# Patient Record
Sex: MALE | Race: WHITE | NOT HISPANIC OR LATINO
[De-identification: names, ages, dates, MRNs, and addresses within clinical notes are randomized per-mention and may not be internally consistent; named-entity substitution may affect disease eponyms.]

---

## 2017-06-06 ENCOUNTER — RESULT REVIEW (OUTPATIENT)
Age: 57
End: 2017-06-06

## 2018-05-08 ENCOUNTER — RX RENEWAL (OUTPATIENT)
Age: 58
End: 2018-05-08

## 2018-10-01 ENCOUNTER — RX RENEWAL (OUTPATIENT)
Age: 58
End: 2018-10-01

## 2018-10-01 RX ORDER — METOPROLOL SUCCINATE 25 MG/1
25 TABLET, EXTENDED RELEASE ORAL
Qty: 180 | Refills: 1 | Status: ACTIVE | COMMUNITY
Start: 2018-05-08 | End: 1900-01-01

## 2019-02-06 ENCOUNTER — APPOINTMENT (OUTPATIENT)
Dept: CARDIOLOGY | Facility: CLINIC | Age: 59
End: 2019-02-06

## 2022-03-31 ENCOUNTER — LABORATORY RESULT (OUTPATIENT)
Age: 62
End: 2022-03-31

## 2022-04-01 ENCOUNTER — APPOINTMENT (OUTPATIENT)
Dept: CARDIOLOGY | Facility: CLINIC | Age: 62
End: 2022-04-01
Payer: COMMERCIAL

## 2022-04-01 VITALS
HEIGHT: 69 IN | TEMPERATURE: 97.4 F | OXYGEN SATURATION: 99 % | RESPIRATION RATE: 16 BRPM | WEIGHT: 175 LBS | HEART RATE: 66 BPM | SYSTOLIC BLOOD PRESSURE: 130 MMHG | BODY MASS INDEX: 25.92 KG/M2 | DIASTOLIC BLOOD PRESSURE: 84 MMHG

## 2022-04-01 PROCEDURE — 93015 CV STRESS TEST SUPVJ I&R: CPT

## 2022-04-01 PROCEDURE — 93306 TTE W/DOPPLER COMPLETE: CPT

## 2022-04-01 PROCEDURE — 99213 OFFICE O/P EST LOW 20 MIN: CPT | Mod: 25

## 2022-04-01 NOTE — DISCUSSION/SUMMARY
[FreeTextEntry1] : This is a 61-year-old male with past medical history significant for occasional palpitations, dyspepsia, who comes in for cardiac consultation.  He denies chest pain, shortness of breath, dizziness or syncope.  He may get occasional dyspnea on exertion walking up hills.\par He has no history of rheumatic fever.  He does not drink excessive caffeine or alcohol.  He has no known cardiac risk factors.\par He had a normal cardiac catheterization in 2003.\par The patient had a normal exercise stress test April 1, 2022.\par The patient will have an echo Doppler examination today to rule out mitral valve prolapse, evaluate his left ventricular function, chamber size, and rule out hypertrophy.\par Blood work will be done for electrolytes and lipid panel.\par The patient is encouraged to increase his fluid intake.  His palpitations have been relieved on Toprol-XL 25 mg daily.\par He exercises on a regular basis participating in [employer] theory class throughout the week.\par He will continue his current aerobic activity.  He understands he must increase his hydration.\par I have also recommended he put in more incline work in his daily exercise and orange therapy routine.\par The patient understands that aerobic exercises must be increased to 40 minutes 4 times per week. A detailed discussion of lifestyle modification was done today. The patient has a good understanding of the diagnosis, and treatment plan. Lifestyle modification was also outlined.

## 2022-04-01 NOTE — PHYSICAL EXAM
[Well Developed] : well developed [Well Nourished] : well nourished [No Acute Distress] : no acute distress [Normal Conjunctiva] : normal conjunctiva [Normal Venous Pressure] : normal venous pressure [No Carotid Bruit] : no carotid bruit [Normal S1, S2] : normal S1, S2 [No Rub] : no rub [5th Left ICS - MCL] : palpated at the 5th LICS in the midclavicular line [Normal] : normal [No Precordial Heave] : no precordial heave was noted [Normal Rate] : normal [Rhythm Regular] : regular [Normal S1] : normal S1 [Normal S2] : normal S2 [No Gallop] : no gallop heard [No Murmur] : no murmurs heard [No Pitting Edema] : no pitting edema present [2+] : left 2+ [No Abnormalities] : the abdominal aorta was not enlarged and no bruit was heard [Clear Lung Fields] : clear lung fields [Good Air Entry] : good air entry [No Respiratory Distress] : no respiratory distress  [Soft] : abdomen soft [Non Tender] : non-tender [No Masses/organomegaly] : no masses/organomegaly [Normal Bowel Sounds] : normal bowel sounds [Normal Gait] : normal gait [No Edema] : no edema [No Cyanosis] : no cyanosis [No Clubbing] : no clubbing [No Varicosities] : no varicosities [No Rash] : no rash [No Skin Lesions] : no skin lesions [Moves all extremities] : moves all extremities [No Focal Deficits] : no focal deficits [Normal Speech] : normal speech [Alert and Oriented] : alert and oriented [Normal memory] : normal memory [S3] : no S3 [S4] : no S4 [Click] : no click [Distant] : the heart sounds were ~L not distant [Pericardial Rub] : no pericardial rub [Right Carotid Bruit] : no bruit heard over the right carotid [Left Carotid Bruit] : no bruit heard over the left carotid [Right Femoral Bruit] : no bruit heard over the right femoral artery [Left Femoral Bruit] : no bruit heard over the left femoral artery

## 2022-04-04 RX ORDER — OMEPRAZOLE 20 MG/1
20 CAPSULE, DELAYED RELEASE ORAL DAILY
Qty: 30 | Refills: 1 | Status: ACTIVE | COMMUNITY
Start: 2022-04-04

## 2023-03-17 ENCOUNTER — LABORATORY RESULT (OUTPATIENT)
Age: 63
End: 2023-03-17

## 2023-03-17 ENCOUNTER — APPOINTMENT (OUTPATIENT)
Dept: CARDIOLOGY | Facility: CLINIC | Age: 63
End: 2023-03-17
Payer: COMMERCIAL

## 2023-03-17 VITALS
WEIGHT: 181 LBS | BODY MASS INDEX: 27.43 KG/M2 | SYSTOLIC BLOOD PRESSURE: 129 MMHG | DIASTOLIC BLOOD PRESSURE: 81 MMHG | HEART RATE: 55 BPM | RESPIRATION RATE: 16 BRPM | TEMPERATURE: 98.2 F | OXYGEN SATURATION: 95 % | HEIGHT: 68 IN

## 2023-03-17 PROCEDURE — 99213 OFFICE O/P EST LOW 20 MIN: CPT | Mod: 25

## 2023-03-17 PROCEDURE — 93015 CV STRESS TEST SUPVJ I&R: CPT

## 2023-03-20 NOTE — DISCUSSION/SUMMARY
[FreeTextEntry1] : This is a 62-year-old male with past medical history significant for COVID-19 May 2022, occasional palpitations, dyspepsia, who comes in for cardiac consultation.  He denies chest pain, shortness of breath, dizziness or syncope.  He may get occasional dyspnea on exertion with stairs.  He may still get occasional palpitations likely due to his atrial premature contractions.  He is starting to exercise on a regular basis and has noted occasional shortness of breath and dizziness after exercising.\par He has no history of rheumatic fever.  He does not drink excessive caffeine or alcohol.\par He has no known cardiac risk factors.\par The patient had a normal exercise stress test March 17, 2023.\par The patient will schedule an echo Doppler examination to evaluate his left ventricular function, chamber size, rule out mitral valve prolapse, and rule out hypertrophy.\par Nonfasting lipid panel done April 1, 2022 demonstrated cholesterol 186, HDL 41, LDL calculated 81, non-HDL cholesterol 145 mg/dL and triglycerides are 321 mg/dL (nonfasting), and direct LDL cholesterol 112 mg/dL.\par The patient is concerned about early atherosclerotic heart disease.  He will have blood work done today including lipoprotein a, lipoprotein B, C-reactive protein, SMA-20 and hemoglobin A1c.\par He will continue on his current diet and exercise program.\par He had a normal cardiac catheterization in 2003.\par The patient had a normal exercise stress test April 1, 2022.\par The patient will have an echo Doppler examination today to rule out mitral valve prolapse, evaluate his left ventricular function, chamber size, and rule out hypertrophy.\par Blood work will be done for electrolytes and lipid panel.\par The patient is encouraged to increase his fluid intake.  His palpitations have been relieved on Toprol-XL 25 mg daily.\par He continues to exercise on a regular basis participating in [employer] theory class throughout the week.\par He will continue his current aerobic activity.  He understands he must increase his hydration.\par I have also recommended he put in more incline work in his daily exercise and orange therapy routine.\par The patient understands that aerobic exercises must be increased to 40 minutes 4 times per week. A detailed discussion of lifestyle modification was done today. The patient has a good understanding of the diagnosis, and treatment plan. Lifestyle modification was also outlined.

## 2023-03-20 NOTE — PHYSICAL EXAM
[Well Developed] : well developed [Well Nourished] : well nourished [No Acute Distress] : no acute distress [Normal Conjunctiva] : normal conjunctiva [Normal Venous Pressure] : normal venous pressure [No Carotid Bruit] : no carotid bruit [Normal S1, S2] : normal S1, S2 [No Rub] : no rub [5th Left ICS - MCL] : palpated at the 5th LICS in the midclavicular line [Normal] : normal [No Precordial Heave] : no precordial heave was noted [Normal Rate] : normal [Rhythm Regular] : regular [Normal S1] : normal S1 [Normal S2] : normal S2 [No Gallop] : no gallop heard [No Murmur] : no murmurs heard [No Pitting Edema] : no pitting edema present [2+] : left 2+ [No Abnormalities] : the abdominal aorta was not enlarged and no bruit was heard [Clear Lung Fields] : clear lung fields [Good Air Entry] : good air entry [No Respiratory Distress] : no respiratory distress  [Soft] : abdomen soft [Non Tender] : non-tender [No Masses/organomegaly] : no masses/organomegaly [Normal Gait] : normal gait [Normal Bowel Sounds] : normal bowel sounds [No Edema] : no edema [No Cyanosis] : no cyanosis [No Clubbing] : no clubbing [No Varicosities] : no varicosities [No Rash] : no rash [No Skin Lesions] : no skin lesions [Moves all extremities] : moves all extremities [No Focal Deficits] : no focal deficits [Normal Speech] : normal speech [Alert and Oriented] : alert and oriented [Normal memory] : normal memory [S3] : no S3 [S4] : no S4 [Click] : no click [Pericardial Rub] : no pericardial rub [Right Carotid Bruit] : no bruit heard over the right carotid [Left Carotid Bruit] : no bruit heard over the left carotid [Right Femoral Bruit] : no bruit heard over the right femoral artery [Left Femoral Bruit] : no bruit heard over the left femoral artery

## 2023-03-21 RX ORDER — FINASTERIDE 1 MG/1
1 TABLET ORAL DAILY
Refills: 0 | Status: COMPLETED | COMMUNITY
Start: 2022-04-04 | End: 2023-03-21

## 2023-03-28 ENCOUNTER — APPOINTMENT (OUTPATIENT)
Dept: CARDIOLOGY | Facility: CLINIC | Age: 63
End: 2023-03-28
Payer: COMMERCIAL

## 2023-03-28 PROCEDURE — 93306 TTE W/DOPPLER COMPLETE: CPT

## 2024-03-07 ENCOUNTER — APPOINTMENT (OUTPATIENT)
Dept: CARDIOLOGY | Facility: CLINIC | Age: 64
End: 2024-03-07
Payer: COMMERCIAL

## 2024-03-07 ENCOUNTER — LABORATORY RESULT (OUTPATIENT)
Age: 64
End: 2024-03-07

## 2024-03-07 ENCOUNTER — NON-APPOINTMENT (OUTPATIENT)
Age: 64
End: 2024-03-07

## 2024-03-07 VITALS
SYSTOLIC BLOOD PRESSURE: 129 MMHG | BODY MASS INDEX: 26.52 KG/M2 | RESPIRATION RATE: 16 BRPM | HEART RATE: 64 BPM | WEIGHT: 175 LBS | OXYGEN SATURATION: 97 % | DIASTOLIC BLOOD PRESSURE: 87 MMHG | HEIGHT: 68 IN | TEMPERATURE: 98.1 F

## 2024-03-07 DIAGNOSIS — I49.1 ATRIAL PREMATURE DEPOLARIZATION: ICD-10-CM

## 2024-03-07 PROCEDURE — 93015 CV STRESS TEST SUPVJ I&R: CPT

## 2024-03-07 PROCEDURE — 99213 OFFICE O/P EST LOW 20 MIN: CPT | Mod: 25

## 2024-03-11 NOTE — DISCUSSION/SUMMARY
[FreeTextEntry1] : This is a 63-year-old male with past medical history significant for COVID-19 May 2022, occasional headaches, occasional palpitations, dyspepsia, who comes in for cardiac consultation.  He denies chest pain, shortness of breath, dizziness or syncope.  He may get occasional dyspnea on exertion with stairs.  He also has been exercising at [employer] but feels that his aerobic capacity is less.  He may still get occasional palpitations likely due to his atrial premature contractions.  He is starting to exercise on a regular basis and has noted occasional shortness of breath and dizziness after exercising. He has no history of rheumatic fever.  He does not drink excessive caffeine or alcohol. He has no known cardiac risk factors. The patient's been having headaches over the last few months.  He had a head CAT scan done February 20, 2024 which revealed "atherosclerotic changes with mild white matter lucency especially in the high frontal lobe likely from chronic microvascular ischemia. The patient had a normal exercise stress test March 7, 2024; he had a borderline hypertensive response to exercise. The patient will start Micardis 20 mg daily; I feel he ultimately will need 40 mg dose. He will have blood work done today for lipid panel, lipoprotein a, lipoprotein B, high-sensitivity C-reactive protein, TSH, and SMA 20. I have once again recommended lipid-lowering therapy and he will consider Crestor therapy. Given his current symptoms of dyspnea on exertion, reduced aerobic capacity, and positive family history of atherosclerotic heart disease, I recommend a coronary CTA to evaluate his coronary artery anatomy and rule out significant coronary artery disease. Echo Doppler done March 28, 2023, demonstrated normal left ventricular function with estimated ejection fraction of 65% with minimal mitral valve regurgitation, physiologic tricuspid valve regurgitation physiologic pulmonic valve regurgitation.  The patient had a normal exercise stress test March 17, 2023.  Nonfasting lipid panel done April 1, 2022 demonstrated cholesterol 186, HDL 41, LDL calculated 81, non-HDL cholesterol 145 mg/dL and triglycerides are 321 mg/dL (nonfasting), and direct LDL cholesterol 112 mg/dL. He will continue on his current diet and exercise program. He had a normal cardiac catheterization in 2003. The patient had a normal exercise stress test April 1, 2022. The patient will have an echo Doppler examination today to rule out mitral valve prolapse, evaluate his left ventricular function, chamber size, and rule out hypertrophy. Blood work will be done for electrolytes and lipid panel. The patient is encouraged to increase his fluid intake.  His palpitations have been relieved on Toprol-XL 25 mg daily. He continues to exercise on a regular basis participating in [employer] theory class throughout the week. He will continue his current aerobic activity.  He understands he must increase his hydration. I have also recommended he put in more incline work in his daily exercise and orange therapy routine. The patient understands that aerobic exercises must be increased to 40 minutes 4 times per week. A detailed discussion of lifestyle modification was done today. The patient has a good understanding of the diagnosis, and treatment plan. Lifestyle modification was also outlined.  ADDENDUM March 11, 2024:: Lipid panel done March 7, 2024 demonstrated a cholesterol 190, HDL 39, triglycerides 152, LDL calculated 124, non-HDL cholesterol under 51, LDL direct 118 mg/dL, high-sensitivity C-reactive protein 3.41 (patient reports that he had viral illness). The patient will start Crestor 10 mg daily for primary prevention.  He will have follow-up blood work in 6 to 8 weeks.

## 2024-03-11 NOTE — PHYSICAL EXAM
[Well Developed] : well developed [Well Nourished] : well nourished [No Acute Distress] : no acute distress [Normal Conjunctiva] : normal conjunctiva [Normal Venous Pressure] : normal venous pressure [Normal S1, S2] : normal S1, S2 [No Carotid Bruit] : no carotid bruit [No Rub] : no rub [5th Left ICS - MCL] : palpated at the 5th LICS in the midclavicular line [No Precordial Heave] : no precordial heave was noted [Normal] : normal [Normal Rate] : normal [Normal S1] : normal S1 [Rhythm Regular] : regular [Normal S2] : normal S2 [No Gallop] : no gallop heard [No Murmur] : no murmurs heard [No Pitting Edema] : no pitting edema present [2+] : left 2+ [No Abnormalities] : the abdominal aorta was not enlarged and no bruit was heard [Clear Lung Fields] : clear lung fields [Good Air Entry] : good air entry [No Respiratory Distress] : no respiratory distress  [Soft] : abdomen soft [Non Tender] : non-tender [No Masses/organomegaly] : no masses/organomegaly [Normal Bowel Sounds] : normal bowel sounds [Normal Gait] : normal gait [No Edema] : no edema [No Clubbing] : no clubbing [No Cyanosis] : no cyanosis [No Varicosities] : no varicosities [No Skin Lesions] : no skin lesions [No Rash] : no rash [No Focal Deficits] : no focal deficits [Moves all extremities] : moves all extremities [Normal Speech] : normal speech [Normal memory] : normal memory [Alert and Oriented] : alert and oriented [S3] : no S3 [S4] : no S4 [Click] : no click [Pericardial Rub] : no pericardial rub [Right Carotid Bruit] : no bruit heard over the right carotid [Left Carotid Bruit] : no bruit heard over the left carotid [Right Femoral Bruit] : no bruit heard over the right femoral artery [Left Femoral Bruit] : no bruit heard over the left femoral artery

## 2024-06-18 ENCOUNTER — NON-APPOINTMENT (OUTPATIENT)
Age: 64
End: 2024-06-18

## 2024-06-18 ENCOUNTER — LABORATORY RESULT (OUTPATIENT)
Age: 64
End: 2024-06-18

## 2024-06-18 ENCOUNTER — APPOINTMENT (OUTPATIENT)
Dept: CARDIOLOGY | Facility: CLINIC | Age: 64
End: 2024-06-18
Payer: COMMERCIAL

## 2024-06-18 VITALS
DIASTOLIC BLOOD PRESSURE: 93 MMHG | HEART RATE: 56 BPM | BODY MASS INDEX: 25.01 KG/M2 | OXYGEN SATURATION: 98 % | HEIGHT: 68 IN | SYSTOLIC BLOOD PRESSURE: 148 MMHG | WEIGHT: 165 LBS | TEMPERATURE: 97.6 F | RESPIRATION RATE: 16 BRPM

## 2024-06-18 DIAGNOSIS — R03.0 ELEVATED BLOOD-PRESSURE READING, W/OUT DIAGNOSIS OF HYPERTENSION: ICD-10-CM

## 2024-06-18 DIAGNOSIS — R01.1 CARDIAC MURMUR, UNSPECIFIED: ICD-10-CM

## 2024-06-18 DIAGNOSIS — R00.2 PALPITATIONS: ICD-10-CM

## 2024-06-18 DIAGNOSIS — R06.09 OTHER FORMS OF DYSPNEA: ICD-10-CM

## 2024-06-18 DIAGNOSIS — R93.1 ABNORMAL FINDINGS ON DIAGNOSTIC IMAGING OF HEART AND CORONARY CIRCULATION: ICD-10-CM

## 2024-06-18 PROCEDURE — 99214 OFFICE O/P EST MOD 30 MIN: CPT

## 2024-06-18 PROCEDURE — 93000 ELECTROCARDIOGRAM COMPLETE: CPT

## 2024-06-18 PROCEDURE — G2211 COMPLEX E/M VISIT ADD ON: CPT

## 2024-06-18 RX ORDER — TELMISARTAN 40 MG/1
40 TABLET ORAL
Qty: 90 | Refills: 1 | Status: ACTIVE | COMMUNITY
Start: 2024-03-07

## 2024-06-20 ENCOUNTER — OUTPATIENT (OUTPATIENT)
Dept: OUTPATIENT SERVICES | Facility: HOSPITAL | Age: 64
LOS: 1 days | End: 2024-06-20
Payer: COMMERCIAL

## 2024-06-20 ENCOUNTER — APPOINTMENT (OUTPATIENT)
Dept: CT IMAGING | Facility: CLINIC | Age: 64
End: 2024-06-20
Payer: COMMERCIAL

## 2024-06-20 DIAGNOSIS — R06.09 OTHER FORMS OF DYSPNEA: ICD-10-CM

## 2024-06-20 PROBLEM — R93.1 AGATSTON CORONARY ARTERY CALCIUM SCORE GREATER THAN 400: Status: ACTIVE | Noted: 2024-06-20

## 2024-06-20 PROCEDURE — 75574 CT ANGIO HRT W/3D IMAGE: CPT | Mod: 26

## 2024-06-20 PROCEDURE — 75574 CT ANGIO HRT W/3D IMAGE: CPT

## 2024-06-20 NOTE — PHYSICAL EXAM
[Well Developed] : well developed [Well Nourished] : well nourished [No Acute Distress] : no acute distress [Normal Conjunctiva] : normal conjunctiva [Normal Venous Pressure] : normal venous pressure [No Carotid Bruit] : no carotid bruit [Normal S1, S2] : normal S1, S2 [No Rub] : no rub [5th Left ICS - MCL] : palpated at the 5th LICS in the midclavicular line [Normal] : normal [No Precordial Heave] : no precordial heave was noted [Normal Rate] : normal [Rhythm Regular] : regular [Normal S1] : normal S1 [Normal S2] : normal S2 [No Gallop] : no gallop heard [No Murmur] : no murmurs heard [No Pitting Edema] : no pitting edema present [2+] : left 2+ [No Abnormalities] : the abdominal aorta was not enlarged and no bruit was heard [Clear Lung Fields] : clear lung fields [Good Air Entry] : good air entry [No Respiratory Distress] : no respiratory distress  [Soft] : abdomen soft [Non Tender] : non-tender [No Masses/organomegaly] : no masses/organomegaly [Normal Bowel Sounds] : normal bowel sounds [Normal Gait] : normal gait [No Edema] : no edema [No Cyanosis] : no cyanosis [No Clubbing] : no clubbing [No Varicosities] : no varicosities [No Rash] : no rash [No Skin Lesions] : no skin lesions [Moves all extremities] : moves all extremities [No Focal Deficits] : no focal deficits [Normal Speech] : normal speech [Alert and Oriented] : alert and oriented [Normal memory] : normal memory [S3] : no S3 [S4] : no S4 [Click] : no click [Pericardial Rub] : no pericardial rub [Right Carotid Bruit] : no bruit heard over the right carotid [Left Carotid Bruit] : no bruit heard over the left carotid [Right Femoral Bruit] : no bruit heard over the right femoral artery [Left Femoral Bruit] : no bruit heard over the left femoral artery

## 2024-06-20 NOTE — DISCUSSION/SUMMARY
[FreeTextEntry1] : This is a 63-year-old male with past medical history significant for mild coronary artery disease, COVID-19 May 2022, occasional headaches, occasional palpitations, dyspepsia, who comes in for cardiac consultation.  He denies chest pain, shortness of breath, dizziness or syncope. He may get occasional dyspnea on exertion with stairs.  He also has been exercising at [employer] but feels that his aerobic capacity is less.  He may still get occasional palpitations likely due to his atrial premature contractions.  He is starting to exercise on a regular basis and has noted occasional shortness of breath and dizziness after exercising. He has no history of rheumatic fever.  He does not drink excessive caffeine or alcohol. Cardiac risk factors include borderline hypertension. He is taking Toprol-XL 25 mg twice per day. Echo Doppler examination don March 28, 2023 demonstrated physiologic pulmonic and tricuspid valve regurgitation with minimal mitral valve regurgitation and normal ejection fraction of 65%.  The patient is on Crestor 10 mg daily for primary prevention. The patient had a cardiac catheterization done November 13, 2003 in the setting of sepsis and hypotension which revealed a 30% discrete lesion in the mid segment of left anterior descending artery, normal left circumflex artery, normal right coronary artery, normal left main artery.  This degree of stenosis may have been overestimated in the setting of hypotension and volume depletion. However given the presence of coronary artery disease, dyspnea on exertion, I recommend the patient schedule coronary CTA to rule out significant coronary artery disease.  The patient's blood pressure is elevated today.  He attributes this to some weight gain and dietary indiscretion. I recommend that he restart his Micardis, add a 40 mg dose in the a.m. The patient's been having headaches over the last few months.  He had a head CAT scan done February 20, 2024 which revealed "atherosclerotic changes with mild white matter lucency especially in the high frontal lobe likely from chronic microvascular ischemia. The patient had a normal exercise stress test March 7, 2024; he had a borderline hypertensive response to exercise Given his current symptoms of dyspnea on exertion, reduced aerobic capacity, and positive family history of atherosclerotic heart disease, I recommend a coronary CTA to evaluate his coronary artery anatomy and rule out significant coronary artery disease. Echo Doppler done March 28, 2023, demonstrated normal left ventricular function with estimated ejection fraction of 65% with minimal mitral valve regurgitation, physiologic tricuspid valve regurgitation physiologic pulmonic valve regurgitation.  The patient had a normal exercise stress test March 17, 2023.  Nonfasting lipid panel done April 1, 2022 demonstrated cholesterol 186, HDL 41, LDL calculated 81, non-HDL cholesterol 145 mg/dL and triglycerides are 321 mg/dL (nonfasting), and direct LDL cholesterol 112 mg/dL. He will continue on his current diet and exercise program. The patient had a normal exercise stress test April 1, 2022. . He continues to exercise on a regular basis participating in [employer] theory class throughout the week. He will continue his current aerobic activity.  He understands he must increase his hydration. I have also recommended he put in more incline work in his daily exercise and orange therapy routine. The patient understands that aerobic exercises must be increased to 40 minutes 4 times per week. A detailed discussion of lifestyle modification was done today. The patient has a good understanding of the diagnosis, and treatment plan. Lifestyle modification was also outlined.  ADDENDUM March 11, 2024:: Lipid panel done March 7, 2024 demonstrated a cholesterol 190, HDL 39, triglycerides 152, LDL calculated 124, non-HDL cholesterol under 51, LDL direct 118 mg/dL, high-sensitivity C-reactive protein 3.41 (patient reports that he had viral illness). The patient will start Crestor 10 mg daily for primary prevention.  He will have follow-up blood work in 6 to 8 weeks.  ADDENDUM 6/20/2024 Coronary CTA done June 28, 2024 demonstrated total calcium score of 3713 units consisting of 276 units in the left main artery, LAD 1150 units, left circumflex artery 1156 units, right coronary artery 1131 units. CTA analysis demonstrated left main artery without significant narrowing, left anterior descending artery with mild luminal narrowing in the proximal and mid left anterior descending artery and mixed calcified noncalcified plaque in the distal left anterior descending artery which was classified as severe luminal narrowing, diagonal branches 1 through 3 no significant luminal narrowing, diagonal branch 4 with small caliber with severe luminal narrowing, left circumflex artery had mild luminal narrowing in the proximal region, and severe luminal narrowing in the distal left circumflex artery, minimal luminal narrowing in the first obtuse marginal artery, right coronary artery had calcified plaque with mild luminal narrowing. Given the patient's dyspnea on exertion, and extensive coronary artery calcification, and cardiovascular risk, I would recommend cardiac catheterization to define his coronary artery anatomy and evaluate the severe stenosis noted on coronary CTA. The patient will follow-up with me after catheterization is completed.

## 2024-06-21 ENCOUNTER — RESULT REVIEW (OUTPATIENT)
Age: 64
End: 2024-06-21

## 2024-06-21 ENCOUNTER — OUTPATIENT (OUTPATIENT)
Dept: OUTPATIENT SERVICES | Facility: HOSPITAL | Age: 64
LOS: 1 days | End: 2024-06-21
Payer: COMMERCIAL

## 2024-06-21 DIAGNOSIS — Z00.8 ENCOUNTER FOR OTHER GENERAL EXAMINATION: ICD-10-CM

## 2024-06-21 PROCEDURE — 75580 N-INVAS EST C FFR SW ALY CTA: CPT | Mod: 26

## 2024-06-21 PROCEDURE — 75580 N-INVAS EST C FFR SW ALY CTA: CPT

## 2024-06-24 ENCOUNTER — OUTPATIENT (OUTPATIENT)
Dept: OUTPATIENT SERVICES | Facility: HOSPITAL | Age: 64
LOS: 1 days | End: 2024-06-24
Payer: COMMERCIAL

## 2024-06-24 ENCOUNTER — TRANSCRIPTION ENCOUNTER (OUTPATIENT)
Age: 64
End: 2024-06-24

## 2024-06-24 VITALS
HEIGHT: 67 IN | RESPIRATION RATE: 18 BRPM | DIASTOLIC BLOOD PRESSURE: 104 MMHG | WEIGHT: 175.05 LBS | HEART RATE: 65 BPM | SYSTOLIC BLOOD PRESSURE: 165 MMHG | TEMPERATURE: 98 F | OXYGEN SATURATION: 96 %

## 2024-06-24 VITALS
OXYGEN SATURATION: 98 % | SYSTOLIC BLOOD PRESSURE: 153 MMHG | HEART RATE: 52 BPM | RESPIRATION RATE: 13 BRPM | DIASTOLIC BLOOD PRESSURE: 81 MMHG

## 2024-06-24 DIAGNOSIS — R93.1 ABNORMAL FINDINGS ON DIAGNOSTIC IMAGING OF HEART AND CORONARY CIRCULATION: ICD-10-CM

## 2024-06-24 LAB
ANION GAP SERPL CALC-SCNC: 13 MMOL/L — SIGNIFICANT CHANGE UP (ref 5–17)
BUN SERPL-MCNC: 13 MG/DL — SIGNIFICANT CHANGE UP (ref 7–23)
CALCIUM SERPL-MCNC: 9.5 MG/DL — SIGNIFICANT CHANGE UP (ref 8.4–10.5)
CHLORIDE SERPL-SCNC: 104 MMOL/L — SIGNIFICANT CHANGE UP (ref 96–108)
CO2 SERPL-SCNC: 24 MMOL/L — SIGNIFICANT CHANGE UP (ref 22–31)
CREAT SERPL-MCNC: 1.05 MG/DL — SIGNIFICANT CHANGE UP (ref 0.5–1.3)
EGFR: 81 ML/MIN/1.73M2 — SIGNIFICANT CHANGE UP
GLUCOSE SERPL-MCNC: 109 MG/DL — HIGH (ref 70–99)
HCT VFR BLD CALC: 45.7 % — SIGNIFICANT CHANGE UP (ref 39–50)
HGB BLD-MCNC: 15.8 G/DL — SIGNIFICANT CHANGE UP (ref 13–17)
MCHC RBC-ENTMCNC: 30.6 PG — SIGNIFICANT CHANGE UP (ref 27–34)
MCHC RBC-ENTMCNC: 34.6 GM/DL — SIGNIFICANT CHANGE UP (ref 32–36)
MCV RBC AUTO: 88.6 FL — SIGNIFICANT CHANGE UP (ref 80–100)
NRBC # BLD: 0 /100 WBCS — SIGNIFICANT CHANGE UP (ref 0–0)
PLATELET # BLD AUTO: 243 K/UL — SIGNIFICANT CHANGE UP (ref 150–400)
POTASSIUM SERPL-MCNC: 4.3 MMOL/L — SIGNIFICANT CHANGE UP (ref 3.5–5.3)
POTASSIUM SERPL-SCNC: 4.3 MMOL/L — SIGNIFICANT CHANGE UP (ref 3.5–5.3)
RBC # BLD: 5.16 M/UL — SIGNIFICANT CHANGE UP (ref 4.2–5.8)
RBC # FLD: 12.7 % — SIGNIFICANT CHANGE UP (ref 10.3–14.5)
SODIUM SERPL-SCNC: 141 MMOL/L — SIGNIFICANT CHANGE UP (ref 135–145)
WBC # BLD: 12.45 K/UL — HIGH (ref 3.8–10.5)
WBC # FLD AUTO: 12.45 K/UL — HIGH (ref 3.8–10.5)

## 2024-06-24 PROCEDURE — 80048 BASIC METABOLIC PNL TOTAL CA: CPT

## 2024-06-24 PROCEDURE — C1894: CPT

## 2024-06-24 PROCEDURE — 85027 COMPLETE CBC AUTOMATED: CPT

## 2024-06-24 PROCEDURE — 93454 CORONARY ARTERY ANGIO S&I: CPT

## 2024-06-24 PROCEDURE — C1887: CPT

## 2024-06-24 PROCEDURE — 93005 ELECTROCARDIOGRAM TRACING: CPT

## 2024-06-24 PROCEDURE — 93010 ELECTROCARDIOGRAM REPORT: CPT

## 2024-06-24 PROCEDURE — C1769: CPT

## 2024-06-24 PROCEDURE — 99152 MOD SED SAME PHYS/QHP 5/>YRS: CPT

## 2024-06-24 PROCEDURE — 93454 CORONARY ARTERY ANGIO S&I: CPT | Mod: 26

## 2024-06-24 PROCEDURE — 36415 COLL VENOUS BLD VENIPUNCTURE: CPT

## 2024-06-24 RX ORDER — ROSUVASTATIN CALCIUM 20 MG/1
1 TABLET ORAL
Refills: 0 | DISCHARGE

## 2024-06-24 RX ORDER — SODIUM CHLORIDE 0.9 % (FLUSH) 0.9 %
1000 SYRINGE (ML) INJECTION
Refills: 0 | Status: DISCONTINUED | OUTPATIENT
Start: 2024-06-24 | End: 2024-07-09

## 2024-06-24 RX ORDER — ASPIRIN 325 MG/1
0 TABLET, FILM COATED ORAL
Refills: 0 | DISCHARGE

## 2024-06-24 RX ORDER — SODIUM CHLORIDE 0.9 % (FLUSH) 0.9 %
250 SYRINGE (ML) INJECTION ONCE
Refills: 0 | Status: COMPLETED | OUTPATIENT
Start: 2024-06-24 | End: 2024-06-24

## 2024-06-24 RX ORDER — PANTOPRAZOLE SODIUM 40 MG/10ML
1 INJECTION, POWDER, FOR SOLUTION INTRAVENOUS
Refills: 0 | DISCHARGE

## 2024-06-24 RX ORDER — METOPROLOL TARTRATE 50 MG
1 TABLET ORAL
Refills: 0 | DISCHARGE

## 2024-06-24 RX ADMIN — Medication 75 MILLILITER(S): at 13:33

## 2024-06-24 RX ADMIN — Medication 1000 MILLILITER(S): at 13:34

## 2024-06-25 ENCOUNTER — NON-APPOINTMENT (OUTPATIENT)
Age: 64
End: 2024-06-25

## 2024-06-25 DIAGNOSIS — E78.5 HYPERLIPIDEMIA, UNSPECIFIED: ICD-10-CM

## 2024-06-25 RX ORDER — ROSUVASTATIN CALCIUM 20 MG/1
20 TABLET, FILM COATED ORAL DAILY
Qty: 90 | Refills: 1 | Status: ACTIVE | COMMUNITY
Start: 2024-06-25 | End: 1900-01-01

## 2024-06-25 RX ORDER — EZETIMIBE 10 MG/1
10 TABLET ORAL DAILY
Qty: 90 | Refills: 1 | Status: ACTIVE | COMMUNITY
Start: 2024-06-25 | End: 1900-01-01

## 2024-11-15 ENCOUNTER — RX RENEWAL (OUTPATIENT)
Age: 64
End: 2024-11-15

## 2024-12-24 DIAGNOSIS — R93.1 ABNORMAL FINDINGS ON DIAGNOSTIC IMAGING OF HEART AND CORONARY CIRCULATION: ICD-10-CM

## 2024-12-30 ENCOUNTER — LABORATORY RESULT (OUTPATIENT)
Age: 64
End: 2024-12-30

## 2024-12-30 ENCOUNTER — APPOINTMENT (OUTPATIENT)
Dept: CARDIOLOGY | Facility: CLINIC | Age: 64
End: 2024-12-30
Payer: COMMERCIAL

## 2024-12-30 PROCEDURE — 93306 TTE W/DOPPLER COMPLETE: CPT

## 2025-01-03 ENCOUNTER — TRANSCRIPTION ENCOUNTER (OUTPATIENT)
Age: 65
End: 2025-01-03

## 2025-04-28 ENCOUNTER — RX RENEWAL (OUTPATIENT)
Age: 65
End: 2025-04-28